# Patient Record
Sex: FEMALE | Race: WHITE
[De-identification: names, ages, dates, MRNs, and addresses within clinical notes are randomized per-mention and may not be internally consistent; named-entity substitution may affect disease eponyms.]

---

## 2018-06-21 ENCOUNTER — HOSPITAL ENCOUNTER (EMERGENCY)
Dept: HOSPITAL 89 - ER | Age: 33
Discharge: HOME | End: 2018-06-21
Payer: COMMERCIAL

## 2018-06-21 VITALS — DIASTOLIC BLOOD PRESSURE: 77 MMHG | SYSTOLIC BLOOD PRESSURE: 127 MMHG
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
## 2018-06-21 DIAGNOSIS — S61.412A: Primary | ICD-10-CM

## 2018-06-21 PROCEDURE — 99282 EMERGENCY DEPT VISIT SF MDM: CPT

## 2018-06-21 NOTE — ER REPORT
History and Physical


Time Seen By MD:  08:34


HPI/ROS


CHIEF COMPLAINT: Left lateral hand laceration





HISTORY OF PRESENT ILLNESS: Patient is a 32-year-old female here with 

complaints of a small superficial left lateral hand laceration approximately 2 

cm in length. Patient was reportedly cutting laminate material with a new sharp 

cut her when she slipped and cut her hand. Tetanus is up-to-date. Patient 

denies other injury at this time. Hemostasis is achieved. Neurovascular exam is 

intact





REVIEW OF SYSTEMS:


Neuro: NV exam intact of upper extremity


Skin: + 2 cm superficial laceration of left lateral hand


Allergies:  


Coded Allergies:  


     No Known Drug Allergies (Unverified , 6/21/18)


Home Meds


Reported Medications


Fluoxetine Hcl (PROZAC) 20 Mg Capsule, 60 MG PO QDAY, CAPSULE


   6/21/18


Pantoprazole Sodium (PANTOPRAZOLE SODIUM) 40 Mg Tablet.dr, 20 MG PO QDAY, TAB.SR


   6/21/18


Amphet Asp/Amphet/D-Amphet (ADDERALL XR 30 MG CAPSULE) 30 Mg Cap.er.24h, 30 MG 

PO DAILY


   6/21/18


Bupropion Hcl (WELLBUTRIN) 100 Mg Tablet, 300 MG PO QDAY, TAB


   4/20/16


Discontinued Reported Medications


Loratadine (CLARITIN) 10 Mg Capsule, 10 MG PO QDAY, CAPSULE


   4/20/16


Norgestimate-Ethinyl Estradiol (ORTHO TRI-CYCLEN) 1 Each Tablet, 1 EACH PO QDAY


   4/20/16


Discontinued Scripts


Hydrocodone Bit/Acetaminophen (HYDROCODON-ACETAMINOPHEN 5-325) 1 Each Tablet, 1 

EACH PO Q4-6H for PAIN, #2


   TAKE ONE TABLET BY MOUTH


   EVERY 4-6 HOURS AS NEEDED


   FOR PAIN


   Prov:TANIYA WALKER MD         4/12/16


Sulfasalazine (SULFASALAZINE) 500 Mg Tablet, 500 MG PO BID, #60 TAB


   Prov:TANIYA WALKER MD         4/12/16


Lorazepam (ATIVAN) 1 Mg Tablet, 1 MG PO Q6-8H Y for ANXIETY, #12


   Prov:HILARIO MCPHERSON DO         4/1/16


Hx Smoking:  No


Smoking Status:  Never Smoker


Exposure to Second Hand Smoke?:  No


Hx Substance Use Disorder:  No


Hx Alcohol Use:  No


Constitutional





Vital Sign - Last 24 Hours








 6/21/18





 08:35


 


Temp 98.2


 


Pulse 74


 


Resp 18


 


B/P (MAP) 141/92


 


Pulse Ox 94


 


O2 Delivery Room Air








Physical Exam


  General Appearance: The patient is alert, has no immediate need for airway 

protection and no current signs of toxicity.  NAD


   Extremities have full range of motion and are non tender.


Skin: No rashes or lesions.


Neuro: NV exam intact








DIFFERENTIAL DIAGNOSIS: After history and physical exam differential diagnosis 

was considered for laceration, abrasion





Medical Decision Making


ED Course/Re-evaluation


ED Course


Patient is a 32-year-old female here with a small superficial laceration left 

lateral hand. Tetanus is up-to-date. Wound was closed using 3 4-0 suture. 

Patient was advised to have the sutures removed in 7-10 days. Neurovascular 

exam was intact after closure.


Procedure


Wound was instilled with approximately 2 mL of Lidocaine 1% with epinephrine. 

Laceration was closed using 4-0 Ethilon sutures 3. Hemostasis was achieved. 

Wound was covered with a bandage. Neurovascular exam was intact after closure.


Decision to Disposition Date:  Jun 21, 2018


Decision to Disposition Time:  09:11





Depart


Departure


Latest Vital Signs





Vital Signs








  Date Time  Temp Pulse Resp B/P (MAP) Pulse Ox O2 Delivery O2 Flow Rate FiO2


 


6/21/18 08:35 98.2 74 18 141/92 94 Room Air  








Impression:  


 Primary Impression:  


 Hand laceration


Condition:  Improved


Disposition:  HOME OR SELF-CARE


Referrals:  


HARVEY TSE MD (PCP)


Patient Instructions:  Hand Laceration





Additional Instructions:  


Please have sutures removed in 7-10 days. Please return promptly if you develop 

worsening numbness, weakness, fevers, chills, rashes.











ALY OREILLY DO Jun 21, 2018 08:35